# Patient Record
Sex: MALE | Employment: FULL TIME | URBAN - METROPOLITAN AREA
[De-identification: names, ages, dates, MRNs, and addresses within clinical notes are randomized per-mention and may not be internally consistent; named-entity substitution may affect disease eponyms.]

---

## 2019-12-11 ENCOUNTER — APPOINTMENT (OUTPATIENT)
Dept: GENERAL RADIOLOGY | Facility: CLINIC | Age: 29
End: 2019-12-11
Attending: NURSE PRACTITIONER

## 2019-12-11 ENCOUNTER — HOSPITAL ENCOUNTER (EMERGENCY)
Facility: CLINIC | Age: 29
Discharge: HOME | End: 2019-12-11
Attending: NURSE PRACTITIONER | Admitting: NURSE PRACTITIONER

## 2019-12-11 VITALS
BODY MASS INDEX: 28.25 KG/M2 | SYSTOLIC BLOOD PRESSURE: 108 MMHG | HEIGHT: 67 IN | HEART RATE: 91 BPM | WEIGHT: 180 LBS | DIASTOLIC BLOOD PRESSURE: 79 MMHG | OXYGEN SATURATION: 100 % | RESPIRATION RATE: 18 BRPM | TEMPERATURE: 98 F

## 2019-12-11 DIAGNOSIS — R07.9 CHEST PAIN: ICD-10-CM

## 2019-12-11 LAB
ANION GAP SERPL CALCULATED.3IONS-SCNC: 8 MMOL/L (ref 3–14)
BUN SERPL-MCNC: 8 MG/DL (ref 7–30)
CALCIUM SERPL-MCNC: 8.8 MG/DL (ref 8.5–10.1)
CHLORIDE SERPL-SCNC: 107 MMOL/L (ref 94–109)
CO2 SERPL-SCNC: 24 MMOL/L (ref 20–32)
CREAT SERPL-MCNC: 0.69 MG/DL (ref 0.66–1.25)
D DIMER PPP FEU-MCNC: <0.3 UG/ML FEU (ref 0–0.5)
ERYTHROCYTE [DISTWIDTH] IN BLOOD BY AUTOMATED COUNT: 12.9 % (ref 10–15)
GFR SERPL CREATININE-BSD FRML MDRD: >90 ML/MIN/{1.73_M2}
GLUCOSE SERPL-MCNC: 143 MG/DL (ref 70–99)
HCT VFR BLD AUTO: 48.4 % (ref 40–53)
HGB BLD-MCNC: 16.4 G/DL (ref 13.3–17.7)
INTERPRETATION ECG - MUSE: NORMAL
MCH RBC QN AUTO: 29.6 PG (ref 26.5–33)
MCHC RBC AUTO-ENTMCNC: 33.9 G/DL (ref 31.5–36.5)
MCV RBC AUTO: 87 FL (ref 78–100)
PLATELET # BLD AUTO: 219 10E9/L (ref 150–450)
POTASSIUM SERPL-SCNC: 3.7 MMOL/L (ref 3.4–5.3)
RBC # BLD AUTO: 5.54 10E12/L (ref 4.4–5.9)
SODIUM SERPL-SCNC: 139 MMOL/L (ref 133–144)
TROPONIN I SERPL-MCNC: <0.015 UG/L (ref 0–0.04)
TROPONIN I SERPL-MCNC: <0.015 UG/L (ref 0–0.04)
WBC # BLD AUTO: 8 10E9/L (ref 4–11)

## 2019-12-11 PROCEDURE — 80048 BASIC METABOLIC PNL TOTAL CA: CPT | Performed by: NURSE PRACTITIONER

## 2019-12-11 PROCEDURE — 85379 FIBRIN DEGRADATION QUANT: CPT | Performed by: NURSE PRACTITIONER

## 2019-12-11 PROCEDURE — 36415 COLL VENOUS BLD VENIPUNCTURE: CPT | Performed by: NURSE PRACTITIONER

## 2019-12-11 PROCEDURE — 93005 ELECTROCARDIOGRAM TRACING: CPT

## 2019-12-11 PROCEDURE — 71046 X-RAY EXAM CHEST 2 VIEWS: CPT

## 2019-12-11 PROCEDURE — 84484 ASSAY OF TROPONIN QUANT: CPT | Performed by: NURSE PRACTITIONER

## 2019-12-11 PROCEDURE — 99285 EMERGENCY DEPT VISIT HI MDM: CPT | Mod: 25

## 2019-12-11 PROCEDURE — 85027 COMPLETE CBC AUTOMATED: CPT | Performed by: NURSE PRACTITIONER

## 2019-12-11 ASSESSMENT — ENCOUNTER SYMPTOMS
VOMITING: 0
CHILLS: 0
DYSURIA: 0
FEVER: 0
MYALGIAS: 0
COUGH: 0
SHORTNESS OF BREATH: 0
NAUSEA: 0

## 2019-12-11 ASSESSMENT — MIFFLIN-ST. JEOR: SCORE: 1735.1

## 2019-12-11 NOTE — ED AVS SNAPSHOT
[facility] Emergency Department  [address]  [facility] [address]  Phone:  [phone]  Fax:  [phone]                                    Paul Brennan   MRN: [id number]    Department:  [facility] Emergency Department   Date of Visit:  12/11/2019           After Visit Summary Signature Page    I have received my discharge instructions, and my questions have been answered. I have discussed any challenges I see with this plan with the nurse or doctor.    ..........................................................................................................................................  Patient/Patient Representative Signature      ..........................................................................................................................................  Patient Representative Print Name and Relationship to Patient    ..................................................               ................................................  Date                                   Time    ..........................................................................................................................................  Reviewed by Signature/Title    ...................................................              ..............................................  Date                                               Time          22EPIC Rev 08/18

## 2019-12-11 NOTE — ED TRIAGE NOTES
Patient presents with left chest pain that started 2 1/2 hours ago that worsens with deep breathing. Denies any airplane ride or long car rides. ABC's intact.

## 2019-12-11 NOTE — ED PROVIDER NOTES
History     Chief Complaint:  Chest Pain    HPI   Paul Brennan is a 29 year old male, with a history of HLD, who presents to the emergency department for evaluation of left sided chest pain. The patient reports he felt normal when he woke up this morning but after arriving to work he started experiencing left sided chest pain that worsened with exertion and inspiration. He notes the pain is now only present with inspiration. He denies any nausea, vomiting, leg swelling or leg pain. He denies any recent injury, fall, or heavy lifting. He denies any history of heart disease. He denies any recent travel.    Allergies:  No known drug allergies    Medications:    The patient is not currently taking any prescribed medications.    Past Medical History:    HLD    Past Surgical History:    History reviewed. No pertinent surgical history.    Family History:    History reviewed. No pertinent family history.     Social History:  Smoking status: Current every day smoker.  Alcohol use: No  Drug use: No  The patient presents to the emergency department by himself.  Marital Status:   [2]     Review of Systems   Constitutional: Negative for chills and fever.   HENT: Negative for congestion.    Respiratory: Negative for cough and shortness of breath.    Cardiovascular: Positive for chest pain. Negative for leg swelling.   Gastrointestinal: Negative for nausea and vomiting.   Genitourinary: Negative for dysuria.   Musculoskeletal: Negative for myalgias.   All other systems reviewed and are negative.      Physical Exam   Patient Vitals for the past 24 hrs:   BP Temp Temp src Pulse Heart Rate Resp SpO2 Height Weight   12/11/19 1315 108/79 -- -- 91 93 -- 100 % -- --   12/11/19 1300 115/76 -- -- 84 86 -- 100 % -- --   12/11/19 1245 126/79 -- -- 89 82 -- 99 % -- --   12/11/19 1230 119/74 -- -- 96 96 -- 100 % -- --   12/11/19 1215 118/76 -- -- 96 95 -- -- -- --   12/11/19 1145 115/74 -- -- 103 97 -- 100 % -- --   12/11/19 1130  "117/74 -- -- 94 104 -- 98 % -- --   12/11/19 1115 118/74 -- -- 94 96 -- 97 % -- --   12/11/19 1100 138/83 -- -- 101 111 -- 100 % -- --   12/11/19 1025 136/83 98  F (36.7  C) Temporal 110 -- 18 100 % 1.702 m (5' 7\") 81.6 kg (180 lb)     Physical Exam  General: Alert, No obvious discomfort, well kept  Eyes: PERRL, conjunctivae pink no scleral icterus or conjunctival injection  ENT:   Moist mucus membranes, posterior oropharynx clear without erythema or exudates, No lymphadenopathy, Normal voice  Resp:  Lungs clear to auscultation bilaterally, no crackles/rubs/wheezes. Good air movement  CV:  Normal rate and rhythm, no murmurs/rubs/gallops  GI:  Abdomen soft and non-distended.  Normoactive BS.  No tenderness, guarding or rebound, No masses  Skin:  Warm, dry.  No rashes or petechiae  Musculoskeletal: No peripheral edema or calf tenderness, Normal gross ROM   Neuro: Alert and oriented to person/place/time, normal sensation  Psychiatric: Normal affect, cooperative, good eye contact    Emergency Department Course   ECG (10:21:14):  Rate 97 bpm. NJ interval 144. QRS duration 84. QT/QTc 322/408. P-R-T axes 70 79 56. Normal Sinus rhythm. Normal ECG. Interpreted at 1142 by Diogenes Ballard MD.    Imaging:  Radiology findings were communicated with the patient who voiced understanding of the findings.    XR Chest 2 Views  IMPRESSION: No acute airspace disease.  As read by Radiology.    Laboratory:  Laboratory findings were communicated with the patient who voiced understanding of the findings.    CBC: AWNL (WBC 8.0, HGB 16.4, )  BMP: Glucose 143 (H) o/w WNL (Creatinine 0.69)    Troponin I (1112): <0.015  Troponin I (1309): <0.015  D dimer: <0.3     Emergency Department Course:  Past medical records, nursing notes, and vitals reviewed.  1100: I performed an exam of the patient and obtained history, as documented above.     EKG was obtained and reviewed in the emergency department.    IV inserted and blood " drawn.    The patient was sent for a chest x-ray while in the emergency department, results above.     1224: I rechecked the patient. Explained findings to the patient.    1356: I rechecked the patient. I reviewed the results with the Patient and answered all related questions prior to discharge.     Findings and plan explained to the Patient. Patient discharged home with instructions regarding supportive care, medications, and reasons to return. The importance of close follow-up was reviewed.  Impression & Plan   Medical Decision Making:  Paul Brennan is a 29 year old male who presents today for evaluation of chest discomfort.  He stated that he developed chest discomfort while at work today.  At my time of evaluation he had no further symptoms.  I was unable to reproduce any symptoms.  He has a negative d-dimer, negative troponin and negative delta troponin.  The remainder of his laboratory studies are noncontributory.  X-rays obtained without indication for acute intrapulmonary process.  No indication for ACS or PE.  Patient appears to be safe and appropriate for outpatient follow-up.  He is advised to follow-up with his primary care provider.  Strict return protocols were discussed.  He is discharged home.       Discharge Diagnosis:    ICD-10-CM   1. Chest pain R07.9     Disposition:  Discharged to home.    Delilah Moore  12/11/2019   [facility] EMERGENCY DEPARTMENT  Scribe Disclosure:  I, Delilah Moore, am serving as a scribe at 11:00 AM on 12/11/2019 to document services personally performed by Aureliano Colon APRN based on my observations and the provider's statements to me.      Aureliano Colon APRN CNP  12/11/19 2445